# Patient Record
Sex: MALE | Race: WHITE | NOT HISPANIC OR LATINO | Employment: OTHER | URBAN - METROPOLITAN AREA
[De-identification: names, ages, dates, MRNs, and addresses within clinical notes are randomized per-mention and may not be internally consistent; named-entity substitution may affect disease eponyms.]

---

## 2017-07-03 ENCOUNTER — TRANSCRIBE ORDERS (OUTPATIENT)
Dept: ADMINISTRATIVE | Facility: HOSPITAL | Age: 67
End: 2017-07-03

## 2017-07-03 DIAGNOSIS — M54.16 LUMBAR RADICULITIS: Primary | ICD-10-CM

## 2017-07-10 ENCOUNTER — HOSPITAL ENCOUNTER (OUTPATIENT)
Dept: MRI IMAGING | Facility: HOSPITAL | Age: 67
Discharge: HOME | End: 2017-07-10
Admitting: PHYSICIAN ASSISTANT

## 2017-07-10 DIAGNOSIS — M54.16 LUMBAR RADICULITIS: ICD-10-CM

## 2017-07-10 PROCEDURE — 72148 MRI LUMBAR SPINE W/O DYE: CPT

## 2017-08-18 ENCOUNTER — OFFICE VISIT (OUTPATIENT)
Dept: NEUROSURGERY | Facility: CLINIC | Age: 67
End: 2017-08-18

## 2017-08-18 VITALS
HEIGHT: 66 IN | WEIGHT: 174.6 LBS | DIASTOLIC BLOOD PRESSURE: 90 MMHG | SYSTOLIC BLOOD PRESSURE: 140 MMHG | BODY MASS INDEX: 28.06 KG/M2 | TEMPERATURE: 97.4 F

## 2017-08-18 DIAGNOSIS — M54.31 RIGHT SIDED SCIATICA: Primary | ICD-10-CM

## 2017-08-18 PROCEDURE — 99203 OFFICE O/P NEW LOW 30 MIN: CPT | Performed by: NEUROLOGICAL SURGERY

## 2017-08-18 NOTE — PROGRESS NOTES
Subjective     Chief Complaint: Right thigh pain    Patient ID: Darvin Nieto is a 67 y.o. male seen for consultation today at the request of  Dave Morales MD    Back Pain   This is a chronic problem. The current episode started more than 1 month ago. The problem occurs intermittently. The problem has been waxing and waning since onset. Pain location: Thigh. The quality of the pain is described as aching. The pain radiates to the right thigh. The pain is at a severity of 8/10. The pain is moderate. The pain is worse during the day. Exacerbated by: Running. Stiffness is present in the morning. Associated symptoms include leg pain. Pertinent negatives include no abdominal pain, bladder incontinence, bowel incontinence, chest pain, dysuria, fever, headaches, numbness, paresis, paresthesias, pelvic pain, perianal numbness, tingling, weakness or weight loss. He has tried analgesics, NSAIDs, home exercises and heat for the symptoms. The treatment provided mild relief.       This is a 67-year-old management for this to my clinic with chief complaints of right hip and thigh pain.  He underwent physical therapy which he states temporarily alleviated his pain, however he is an avid runner and once he started jogging again, his pain returned.    The following portions of the patient's history were reviewed and updated as appropriate: allergies, current medications, past family history, past medical history, past social history, past surgical history and problem list.    Family history:   Family History   Problem Relation Age of Onset   • Cancer Father    • Alzheimer's disease Father        Social history:   Social History     Social History   • Marital status:      Spouse name: N/A   • Number of children: N/A   • Years of education: N/A     Occupational History   • Not on file.     Social History Main Topics   • Smoking status: Former Smoker     Quit date: 1978   • Smokeless tobacco: Never Used   • Alcohol use Yes    • Drug use: No   • Sexual activity: Defer     Other Topics Concern   • Not on file     Social History Narrative       Review of Systems   Constitutional: Negative for activity change, appetite change, chills, diaphoresis, fatigue, fever, unexpected weight change and weight loss.   HENT: Negative for congestion, dental problem, drooling, ear discharge, ear pain, facial swelling, hearing loss, mouth sores, nosebleeds, postnasal drip, rhinorrhea, sinus pressure, sneezing, sore throat, tinnitus, trouble swallowing and voice change.    Eyes: Negative for photophobia, pain, discharge, redness, itching and visual disturbance.   Respiratory: Negative for apnea, cough, choking, chest tightness, shortness of breath, wheezing and stridor.    Cardiovascular: Negative for chest pain, palpitations and leg swelling.   Gastrointestinal: Negative for abdominal distention, abdominal pain, anal bleeding, blood in stool, bowel incontinence, constipation, diarrhea, nausea, rectal pain and vomiting.   Endocrine: Negative for cold intolerance, heat intolerance, polydipsia, polyphagia and polyuria.   Genitourinary: Negative for bladder incontinence, decreased urine volume, difficulty urinating, dysuria, enuresis, flank pain, frequency, genital sores, hematuria, pelvic pain and urgency.   Musculoskeletal: Positive for back pain. Negative for arthralgias, gait problem, joint swelling, myalgias, neck pain and neck stiffness.   Skin: Negative for color change, pallor, rash and wound.   Allergic/Immunologic: Negative for environmental allergies, food allergies and immunocompromised state.   Neurological: Negative for dizziness, tingling, tremors, seizures, syncope, facial asymmetry, speech difficulty, weakness, light-headedness, numbness, headaches and paresthesias.   Hematological: Negative for adenopathy. Does not bruise/bleed easily.   Psychiatric/Behavioral: Negative for agitation, behavioral problems, confusion, decreased concentration,  "dysphoric mood, hallucinations, self-injury, sleep disturbance and suicidal ideas. The patient is not nervous/anxious and is not hyperactive.        Objective   Blood pressure 140/90, temperature 97.4 °F (36.3 °C), height 66\" (167.6 cm), weight 174 lb 9.6 oz (79.2 kg).  Body mass index is 28.18 kg/(m^2).    Physical Exam   Constitutional: He is oriented to person, place, and time. He appears well-developed.  Non-toxic appearance.   HENT:   Head: Normocephalic and atraumatic.   Right Ear: Hearing normal.   Left Ear: Hearing normal.   Nose: Nose normal.   Eyes: Conjunctivae, EOM and lids are normal. Pupils are equal, round, and reactive to light.   Neck: Normal range of motion. No JVD present.   Cardiovascular: Normal rate and regular rhythm.    Pulses:       Radial pulses are 2+ on the right side, and 2+ on the left side.   Pulmonary/Chest: Effort normal. No stridor. No respiratory distress. He has no wheezes.   Musculoskeletal:        Right hip: He exhibits decreased range of motion. He exhibits normal strength and no tenderness.        Left hip: He exhibits normal range of motion, normal strength and no tenderness.        Thoracic back: He exhibits no tenderness, no swelling, no pain and no spasm.        Lumbar back: He exhibits no tenderness, no bony tenderness, no swelling, no pain and no spasm.   Muscle Group    L          R  Hip Flexor          5          5  Knee Extensor  5          5  ADF                   5          5  APF                   5          5  EHL                   5          5    Decreased range of motion right hip on internal and external rotation as compared to the left hip   Neurological: He is alert and oriented to person, place, and time. He has normal strength and normal reflexes. He displays normal reflexes. No cranial nerve deficit or sensory deficit. He exhibits normal muscle tone. He displays a negative Romberg sign. Coordination and gait normal. GCS eye subscore is 4. GCS verbal " subscore is 5. GCS motor subscore is 6.   Reflex Scores:       Tricep reflexes are 2+ on the right side and 2+ on the left side.       Bicep reflexes are 2+ on the right side and 2+ on the left side.       Brachioradialis reflexes are 2+ on the right side and 2+ on the left side.       Patellar reflexes are 2+ on the right side and 2+ on the left side.       Achilles reflexes are 2+ on the right side and 2+ on the left side.  Negative straight leg raise test bilaterally   Skin: Skin is warm and dry. No rash noted. No erythema.   Psychiatric: He has a normal mood and affect. His behavior is normal. Judgment and thought content normal.   Nursing note and vitals reviewed.        Assessment/Plan     Independent Review of Radiographic Studies:      Available for my review is a MRI of the lumbar spine that was performed on 7/10/17.  This discloses mild to moderate, diffuse degenerative disc disease of the lumbar spine.  There are paracentral disc herniations present at nearly every lumbar level, however there is no evidence of lateral recess or neural foraminal stenosis.  The central canal is capacious.  There is mild loss of lumbar lordosis, however there is no evidence of spondylolisthesis or Modic endplate changes.    Medical Decision Making:      This is a 67-year-old man who presents with chief complaints of right-sided hip and leg pain.  He has tried physical therapy once which did not significantly improve his symptoms.  There is no clear MRI correlate for his symptoms.  I do not believe he has lumbosacral radiculopathy.  He is an excellent candidate for continued physical therapy.  I made a referral to a specialist in athletic physical therapy.  I reviewed the signs and symptoms of lumbosacral radiculopathy and cauda equina with the patient.  I directed him to contact my office with new, or worsening symptoms.    Otherwise, I would like to follow up with him in 3 months, or sooner if clinically  indicatedEvin Boston was seen today for sciatica and hip pain.    Diagnoses and all orders for this visit:    Right sided sciatica  -     Ambulatory Referral to Physical Therapy Evaluate and treat    Other orders  -     SCANNED - IMAGING        Return in about 3 months (around 11/18/2017).           This document signed by ALOK Pimentel MD August 18, 2017 2:05 PM

## 2017-10-10 ENCOUNTER — TRANSCRIBE ORDERS (OUTPATIENT)
Dept: ADMINISTRATIVE | Facility: HOSPITAL | Age: 67
End: 2017-10-10

## 2017-10-10 DIAGNOSIS — Z87.891 PERSONAL HISTORY OF TOBACCO USE, PRESENTING HAZARDS TO HEALTH: Primary | ICD-10-CM

## 2017-10-18 ENCOUNTER — HOSPITAL ENCOUNTER (OUTPATIENT)
Dept: ULTRASOUND IMAGING | Facility: HOSPITAL | Age: 67
Discharge: HOME | End: 2017-10-18
Attending: INTERNAL MEDICINE | Admitting: INTERNAL MEDICINE

## 2017-10-18 DIAGNOSIS — Z87.891 PERSONAL HISTORY OF TOBACCO USE, PRESENTING HAZARDS TO HEALTH: ICD-10-CM

## 2017-10-18 PROCEDURE — 76706 US ABDL AORTA SCREEN AAA: CPT

## 2020-07-21 PROCEDURE — U0003 INFECTIOUS AGENT DETECTION BY NUCLEIC ACID (DNA OR RNA); SEVERE ACUTE RESPIRATORY SYNDROME CORONAVIRUS 2 (SARS-COV-2) (CORONAVIRUS DISEASE [COVID-19]), AMPLIFIED PROBE TECHNIQUE, MAKING USE OF HIGH THROUGHPUT TECHNOLOGIES AS DESCRIBED BY CMS-2020-01-R: HCPCS | Performed by: FAMILY MEDICINE

## 2020-07-24 ENCOUNTER — TELEPHONE (OUTPATIENT)
Dept: URGENT CARE | Facility: CLINIC | Age: 70
End: 2020-07-24

## 2021-11-30 DIAGNOSIS — R00.2 PALPITATIONS: Primary | ICD-10-CM

## 2022-04-13 NOTE — PROGRESS NOTES
[facility] Cardiology    Encounter Date: 04/15/2022    Patient ID: Darvin Nieto is a 72 y.o. male retired  resident of [facility]  : 1950     PCP: Dave Morales MD       Chief Complaint: Palpitations, Dizziness, and Irregular Heart Beat      PROBLEM LIST:  1. Palpitations  a. 24-hour Holter 2021 - Dr. Lantigua: Average heart rate 81 bpm, minimum heart rate 56 bpm, max heart rate 137 bpm; rare APCs and PVCs.  No significant arrhythmia.  2. Hypertension  3. Hyperlipidemia  4. Prediabetes  5. Carotid artery disease  6. Obstructive sleep apnea, CPAP compliance  7. History of tobacco abuse, cessation  --pack-year history     History of Present Illness: Darvin Nieto is a 72 y.o. male who presents to the cardiology office today to establish care. He complains of palpitations. He has been having palpitations x20 years but notes that they got substantially worse last fall. His PCP obtained a holter and the results are as listed above. He was started on Cardizem 180 and notes that this improved his episodes of palpitations. Prior to Cardizem, he was having these episodes multiple times daily and they were lasting longer periods of time. Since Cardizem, he is only having episodes of skipping only 1-2x/week, mostly after exercise, and they last only seconds to a minute.  Additionally, he notes that it feels like it takes a long time for his heart rate to return to baseline.  These episodes do not wake him up from sleep.  He does have a regular exercise routine to include power walks, weightlifting, and biking.  He is a previous smoker, but stopped smoking almost 50 years ago.  He does drink 2 caffeinated beverages per day and consumes at least 1 alcoholic beverage per day.  He denies chest pain, shortness of breath, orthopnea, or edema.  He denies any strokelike symptoms.    Past Medical History:   Past Medical History:   Diagnosis Date   •  Hyperlipidemia    • Hypertension    • Low back pain        Past Surgical History:   Past Surgical History:   Procedure Laterality Date   • ABDOMINAL SURGERY     • APPENDECTOMY     • BLEPHAROPLASTY     • HERNIA REPAIR         Family History:   Family History   Problem Relation Age of Onset   • Stroke Mother    • Cancer Father    • Alzheimer's disease Father        Social History:   Social History     Socioeconomic History   • Marital status:    Tobacco Use   • Smoking status: Former Smoker     Quit date:      Years since quittin.3   • Smokeless tobacco: Never Used   Substance and Sexual Activity   • Alcohol use: Yes   • Drug use: No   • Sexual activity: Defer       Tobacco History:   Social History     Tobacco Use   Smoking Status Former Smoker   • Quit date:    • Years since quittin.3   Smokeless Tobacco Never Used       Medications:     Current Outpatient Medications:   •  aspirin 81 MG chewable tablet, Chew 81 mg daily., Disp: , Rfl:   •  Azilsartan Medoxomil (Edarbi) 40 MG tablet, Edarbi 40 mg tablet  Take 0.5 tablets every day by oral route., Disp: , Rfl:   •  dilTIAZem CD (CARDIZEM CD) 180 MG 24 hr capsule, Take 180 mg by mouth Daily., Disp: , Rfl:   •  ezetimibe (ZETIA) 10 MG tablet, Take 10 mg by mouth Daily., Disp: , Rfl:   •  meloxicam (MOBIC) 15 MG tablet, Take 15 mg by mouth Daily., Disp: , Rfl:   •  pravastatin (PRAVACHOL) 40 MG tablet, Take 40 mg by mouth daily., Disp: , Rfl:     Allergies:   No Known Allergies  The following portions of the patient's history were reviewed and updated as appropriate: allergies, current medications, past family history, past medical history, past social history, past surgical history and problem list.    Review of Systems   Constitution: Negative for chills, fever, fatigue, generalized weakness.   Cardiovascular: Positive for palpitations negative for chest pain, dyspnea on exertion, leg swelling, orthopnea, and syncope.   Respiratory: Negative  "for cough, shortness of breath, and wheezing.  HENT: Negative for ear pain, nosebleeds, and tinnitus.  Gastrointestinal: Negative for abdominal pain, constipation, diarrhea, nausea and vomiting.   Genitourinary: No urinary symptoms.  Musculoskeletal: Negative for muscle cramps.  Neurological: Positive for dizziness, negative for headaches, loss of balance, numbness, and symptoms of stroke.  Psychiatric: Normal mental status.     All other systems reviewed and are negative.        Objective:     /74 (BP Location: Left arm, Patient Position: Sitting)   Pulse 56   Ht 165.1 cm (65\")   Wt 75.3 kg (166 lb)   SpO2 99%   BMI 27.62 kg/m²    BP Recheck 146/82    Physical Exam  Constitutional: Patient appears well-developed and well-nourished.   HENT: HEENT exam unremarkable.   Neck: Neck supple. No JVD present. No carotid bruits.   Cardiovascular: Normal rate, regular rhythm and normal heart sounds. No murmur heard.   2+ symmetric pulses.   Pulmonary/Chest: Breath sounds normal. Does not exhibit tenderness.   Abdominal: Abdomen benign.   Musculoskeletal: Does not exhibit edema.   Neurological: Neurological exam unremarkable.   Vitals reviewed.    Data Review:   Lab review 10/31/2022  Lipid panel: , HDL 66, , LDL 68  CMP: GLU 94, BUN 30, creatinine 1.42, GFR 49, , K4.7, , CO2 21, CA 10.1, AST 23, ALT 18      ECG 12 Lead    Date/Time: 4/15/2022 9:49 AM  Performed by: Kim Gray PA-C  Authorized by: Kim Gray PA-C   Comparison: not compared with previous ECG   Previous ECG: no previous ECG available  Rhythm: sinus bradycardia  Rate: bradycardic  BPM: 56    Clinical impression: abnormal EKG  Comments: Left axis deviation               Assessment:      Diagnosis Plan   1. Dizziness  patient with palpitations which have improved since the addition of Cardizem however he has ongoing occasional episodes with associated dizziness.  Will obtain 14-day ZIO, GXT, and echocardiogram for " further evaluation.     2. Palpitations   patient with palpitations and associated dizziness.  Will obtain 14-day ZIO, GXT and echocardiogram for further evaluation.   3. Primary hypertension   patient reports home BP 130s over 70s.  Continue antihypertensive regimen   4. Mixed hyperlipidemia   well-controlled.  Continue statin therapy.     Plan:   Patient with ongoing palpitations with associated dizziness although the episodes have remarkably improved since the addition of Cardizem.  With his history of hypertension and frequent alcohol consumption we are concerned about paroxysmal atrial fibrillation.  He also reports relationship of symptoms to exercise.  At this time we will obtain a 14-day ZIO monitor, echocardiogram, and GXT for further cardiac evaluation.    Patient advised to cut back on caffeine and alcohol consumption.   Continue current medications.   FU in 6 weeks, sooner as needed.  Thank you for allowing us to participate in the care of your patient.     Scribed for Rhonda Lantigua MD by Kim Gray PA-C. 4/15/2022  09:49 EDT     I, Rhonda Lantigua MD, personally performed the services described in this documentation as scribed by the above named individual in my presence, and it is both accurate and complete.  4/15/2022  10:04 EDT        Part of this note may be an electronic transcription/translation of spoken language to printed text using the Dragon Dictation System.

## 2022-04-15 ENCOUNTER — OFFICE VISIT (OUTPATIENT)
Dept: CARDIOLOGY | Facility: CLINIC | Age: 72
End: 2022-04-15

## 2022-04-15 VITALS
BODY MASS INDEX: 27.66 KG/M2 | OXYGEN SATURATION: 99 % | HEART RATE: 56 BPM | SYSTOLIC BLOOD PRESSURE: 148 MMHG | DIASTOLIC BLOOD PRESSURE: 74 MMHG | HEIGHT: 65 IN | WEIGHT: 166 LBS

## 2022-04-15 DIAGNOSIS — R42 DIZZINESS: Primary | ICD-10-CM

## 2022-04-15 DIAGNOSIS — I10 PRIMARY HYPERTENSION: ICD-10-CM

## 2022-04-15 DIAGNOSIS — I20.8 ANGINAL EQUIVALENT: ICD-10-CM

## 2022-04-15 DIAGNOSIS — R00.2 PALPITATIONS: ICD-10-CM

## 2022-04-15 DIAGNOSIS — E78.2 MIXED HYPERLIPIDEMIA: ICD-10-CM

## 2022-04-15 PROCEDURE — 93000 ELECTROCARDIOGRAM COMPLETE: CPT | Performed by: INTERNAL MEDICINE

## 2022-04-15 PROCEDURE — 99204 OFFICE O/P NEW MOD 45 MIN: CPT | Performed by: INTERNAL MEDICINE

## 2022-04-15 RX ORDER — AZILSARTAN KAMEDOXOMIL 40 MG/1
TABLET ORAL
COMMUNITY

## 2022-04-15 RX ORDER — EZETIMIBE 10 MG/1
10 TABLET ORAL DAILY
COMMUNITY

## 2022-04-15 RX ORDER — DILTIAZEM HYDROCHLORIDE 180 MG/1
180 CAPSULE, COATED, EXTENDED RELEASE ORAL DAILY
COMMUNITY
Start: 2022-04-06 | End: 2022-07-29 | Stop reason: SDUPTHER

## 2022-04-15 RX ORDER — MELOXICAM 15 MG/1
15 TABLET ORAL DAILY
COMMUNITY

## 2022-05-04 ENCOUNTER — HOSPITAL ENCOUNTER (OUTPATIENT)
Dept: CARDIOLOGY | Facility: HOSPITAL | Age: 72
Discharge: HOME | End: 2022-05-04
Admitting: INTERNAL MEDICINE

## 2022-05-04 VITALS
DIASTOLIC BLOOD PRESSURE: 86 MMHG | HEIGHT: 65 IN | WEIGHT: 166 LBS | SYSTOLIC BLOOD PRESSURE: 130 MMHG | OXYGEN SATURATION: 99 % | BODY MASS INDEX: 27.66 KG/M2 | HEART RATE: 69 BPM

## 2022-05-04 VITALS
HEIGHT: 65 IN | SYSTOLIC BLOOD PRESSURE: 130 MMHG | DIASTOLIC BLOOD PRESSURE: 86 MMHG | BODY MASS INDEX: 27.66 KG/M2 | WEIGHT: 166 LBS

## 2022-05-04 DIAGNOSIS — R00.2 PALPITATIONS: ICD-10-CM

## 2022-05-04 DIAGNOSIS — E78.2 MIXED HYPERLIPIDEMIA: ICD-10-CM

## 2022-05-04 DIAGNOSIS — I20.8 ANGINAL EQUIVALENT: ICD-10-CM

## 2022-05-04 DIAGNOSIS — I10 PRIMARY HYPERTENSION: ICD-10-CM

## 2022-05-04 DIAGNOSIS — R42 DIZZINESS: ICD-10-CM

## 2022-05-04 LAB
BH CV ECHO MEAS - AO MAX PG: 4.1 MMHG
BH CV ECHO MEAS - AO MEAN PG: 2.22 MMHG
BH CV ECHO MEAS - AO ROOT DIAM: 3.9 CM
BH CV ECHO MEAS - AO V2 MAX: 101.7 CM/SEC
BH CV ECHO MEAS - AO V2 VTI: 21.6 CM
BH CV ECHO MEAS - AVA(I,D): 2.6 CM2
BH CV ECHO MEAS - EDV(CUBED): 94.6 ML
BH CV ECHO MEAS - EDV(MOD-SP2): 126 ML
BH CV ECHO MEAS - EDV(MOD-SP4): 160 ML
BH CV ECHO MEAS - EF(MOD-BP): 54 %
BH CV ECHO MEAS - EF(MOD-SP2): 53.2 %
BH CV ECHO MEAS - EF(MOD-SP4): 59.4 %
BH CV ECHO MEAS - ESV(CUBED): 37.2 ML
BH CV ECHO MEAS - ESV(MOD-SP2): 59 ML
BH CV ECHO MEAS - ESV(MOD-SP4): 65 ML
BH CV ECHO MEAS - FS: 26.7 %
BH CV ECHO MEAS - IVS/LVPW: 0.92 CM
BH CV ECHO MEAS - IVSD: 1.01 CM
BH CV ECHO MEAS - LA DIMENSION: 3.6 CM
BH CV ECHO MEAS - LAT PEAK E' VEL: 14.9 CM/SEC
BH CV ECHO MEAS - LV DIASTOLIC VOL/BSA (35-75): 87.5 CM2
BH CV ECHO MEAS - LV MASS(C)D: 168.9 GRAMS
BH CV ECHO MEAS - LV MAX PG: 3.4 MMHG
BH CV ECHO MEAS - LV MEAN PG: 1.69 MMHG
BH CV ECHO MEAS - LV SYSTOLIC VOL/BSA (12-30): 35.6 CM2
BH CV ECHO MEAS - LV V1 MAX: 91.9 CM/SEC
BH CV ECHO MEAS - LV V1 VTI: 18.4 CM
BH CV ECHO MEAS - LVIDD: 4.6 CM
BH CV ECHO MEAS - LVIDS: 3.3 CM
BH CV ECHO MEAS - LVOT AREA: 3.1 CM2
BH CV ECHO MEAS - LVOT DIAM: 1.99 CM
BH CV ECHO MEAS - LVPWD: 1.1 CM
BH CV ECHO MEAS - PA ACC SLOPE: 848.8 CM/SEC2
BH CV ECHO MEAS - PA ACC TIME: 0.06 SEC
BH CV ECHO MEAS - PA PR(ACCEL): 53.6 MMHG
BH CV ECHO MEAS - PA V2 MAX: 70 CM/SEC
BH CV ECHO MEAS - RAP SYSTOLE: 3 MMHG
BH CV ECHO MEAS - RVSP: 19 MMHG
BH CV ECHO MEAS - SI(MOD-SP2): 36.7 ML/M2
BH CV ECHO MEAS - SI(MOD-SP4): 52 ML/M2
BH CV ECHO MEAS - SV(LVOT): 57 ML
BH CV ECHO MEAS - SV(MOD-SP2): 67 ML
BH CV ECHO MEAS - SV(MOD-SP4): 95 ML
BH CV ECHO MEAS - TAPSE (>1.6): 2.3 CM
BH CV ECHO MEAS - TR MAX PG: 16.8 MMHG
BH CV ECHO MEAS - TR MAX VEL: 205 CM/SEC
BH CV STRESS BP STAGE 1: NORMAL
BH CV STRESS BP STAGE 2: NORMAL
BH CV STRESS DURATION MIN STAGE 1: 3
BH CV STRESS DURATION MIN STAGE 2: 3
BH CV STRESS DURATION MIN STAGE 3: 1
BH CV STRESS DURATION SEC STAGE 1: 0
BH CV STRESS DURATION SEC STAGE 2: 0
BH CV STRESS DURATION SEC STAGE 3: 17
BH CV STRESS GRADE STAGE 1: 10
BH CV STRESS GRADE STAGE 2: 12
BH CV STRESS GRADE STAGE 3: 14
BH CV STRESS HR STAGE 1: 105
BH CV STRESS HR STAGE 2: 118
BH CV STRESS HR STAGE 3: 136
BH CV STRESS METS STAGE 1: 5
BH CV STRESS METS STAGE 2: 7.5
BH CV STRESS METS STAGE 3: 10
BH CV STRESS O2 STAGE 1: 100
BH CV STRESS O2 STAGE 2: 100
BH CV STRESS O2 STAGE 3: 100
BH CV STRESS PROTOCOL 1: NORMAL
BH CV STRESS RECOVERY BP: NORMAL MMHG
BH CV STRESS RECOVERY HR: 85 BPM
BH CV STRESS RECOVERY O2: 100 %
BH CV STRESS SPEED STAGE 1: 1.7
BH CV STRESS SPEED STAGE 2: 2.5
BH CV STRESS SPEED STAGE 3: 3.4
BH CV STRESS STAGE 1: 1
BH CV STRESS STAGE 2: 2
BH CV STRESS STAGE 3: 3
BH CV XLRA - RV BASE: 3.5 CM
BH CV XLRA - RV LENGTH: 6.8 CM
BH CV XLRA - RV MID: 2.7 CM
BH CV XLRA - TDI S': 9.83 CM/SEC
LEFT ATRIUM VOLUME INDEX: 23 ML/M2
LV EF 2D ECHO EST: 60 %
MAXIMAL PREDICTED HEART RATE: 148 BPM
MAXIMAL PREDICTED HEART RATE: 148 BPM
PERCENT MAX PREDICTED HR: 91.89 %
STRESS BASELINE BP: NORMAL MMHG
STRESS BASELINE HR: 68 BPM
STRESS O2 SAT REST: 99 %
STRESS PERCENT HR: 108 %
STRESS POST ESTIMATED WORKLOAD: 10.1 METS
STRESS POST EXERCISE DUR MIN: 7 MIN
STRESS POST EXERCISE DUR SEC: 17 SEC
STRESS POST O2 SAT PEAK: 100 %
STRESS POST PEAK BP: NORMAL MMHG
STRESS POST PEAK HR: 136 BPM
STRESS TARGET HR: 126 BPM
STRESS TARGET HR: 126 BPM

## 2022-05-04 PROCEDURE — 93306 TTE W/DOPPLER COMPLETE: CPT | Performed by: INTERNAL MEDICINE

## 2022-05-04 PROCEDURE — 93306 TTE W/DOPPLER COMPLETE: CPT

## 2022-05-04 PROCEDURE — 93017 CV STRESS TEST TRACING ONLY: CPT

## 2022-05-04 PROCEDURE — 93018 CV STRESS TEST I&R ONLY: CPT | Performed by: INTERNAL MEDICINE

## 2022-07-29 ENCOUNTER — OFFICE VISIT (OUTPATIENT)
Dept: CARDIOLOGY | Facility: CLINIC | Age: 72
End: 2022-07-29

## 2022-07-29 VITALS
DIASTOLIC BLOOD PRESSURE: 70 MMHG | HEART RATE: 68 BPM | HEIGHT: 65 IN | BODY MASS INDEX: 26.99 KG/M2 | SYSTOLIC BLOOD PRESSURE: 130 MMHG | WEIGHT: 162 LBS | OXYGEN SATURATION: 97 %

## 2022-07-29 DIAGNOSIS — I10 PRIMARY HYPERTENSION: ICD-10-CM

## 2022-07-29 DIAGNOSIS — R00.2 PALPITATIONS: ICD-10-CM

## 2022-07-29 DIAGNOSIS — E78.2 MIXED HYPERLIPIDEMIA: ICD-10-CM

## 2022-07-29 DIAGNOSIS — R42 DIZZINESS: Primary | ICD-10-CM

## 2022-07-29 PROCEDURE — 99214 OFFICE O/P EST MOD 30 MIN: CPT | Performed by: INTERNAL MEDICINE

## 2022-07-29 RX ORDER — DILTIAZEM HYDROCHLORIDE 240 MG/1
240 CAPSULE, COATED, EXTENDED RELEASE ORAL DAILY
Qty: 90 CAPSULE | Refills: 3 | Status: SHIPPED | OUTPATIENT
Start: 2022-07-29

## 2022-07-29 NOTE — PROGRESS NOTES
[facility] Cardiology    Encounter Date: 2022    Patient ID: Darvin Nieto is a 72 y.o. male.  : 1950     PCP: Dave Morales MD       Chief Complaint: Primary hypertension      PROBLEM LIST:  1. Palpitations  a. 24-hour Holter 2021 - Dr. Lantigua: Average heart rate 81 bpm, minimum heart rate 56 bpm, max heart rate 137 bpm; rare APCs and PVCs.  No significant arrhythmia.  b. Treadmill stress test, 2022; The test is negative for anginal chest pain or diagnostic ST segment changes at high workload and target heart rate. Occasional PACs and PVCs were noted throughout the stress test with no relationship to exercise. No exercise-induced arrhythmias were noted.  c. Echocardiogram, 2022; EF 60%. Trace to mild MR. Trace TR with normal RVSP.   d. Holter monitor, 2022; SR. Rare APCs and PVCs. One short NSVT (4 beats). Rare short SVT up to 10 beats. No symptoms.   2. Hypertension  3. Hyperlipidemia  4. Prediabetes  5. Carotid artery disease  6. Obstructive sleep apnea, CPAP compliance  7. History of tobacco abuse, cessation  -12-pack-year history        History of Present Illness  Patient presents today for a follow-up with a history of palpiations and cardiac risk factors. Since last visit, patient has continued to have palpitations approximately twice per week. He denies any chest pain, shortness of breath, palpitations, orthopnea, and edema.    No Known Allergies      Current Outpatient Medications:   •  aspirin 81 MG chewable tablet, Chew 81 mg daily., Disp: , Rfl:   •  Azilsartan Medoxomil (Edarbi) 40 MG tablet, Edarbi 40 mg tablet  Take 0.5 tablets every day by oral route., Disp: , Rfl:   •  dilTIAZem CD (CARDIZEM CD) 180 MG 24 hr capsule, Take 180 mg by mouth Daily., Disp: , Rfl:   •  ezetimibe (ZETIA) 10 MG tablet, Take 10 mg by mouth Daily., Disp: , Rfl:   •  meloxicam (MOBIC) 15 MG tablet, Take 15 mg by mouth Daily., Disp: , Rfl:   •   "pravastatin (PRAVACHOL) 40 MG tablet, Take 40 mg by mouth daily., Disp: , Rfl:     The following portions of the patient's history were reviewed and updated as appropriate: allergies, current medications, past family history, past medical history, past social history, past surgical history and problem list.    ROS  Review of Systems   Constitution: Negative for chills, fever, fatigue, generalized weakness.   Cardiovascular: Negative for chest pain, dyspnea on exertion, leg swelling, palpitations, orthopnea, and syncope.   Respiratory: Negative for cough, shortness of breath, and wheezing.  HENT: Negative for ear pain, nosebleeds, and tinnitus.  Gastrointestinal: Negative for abdominal pain, constipation, diarrhea, nausea and vomiting.   Genitourinary: No urinary symptoms.  Musculoskeletal: Negative for muscle cramps.  Neurological: Negative for dizziness, headaches, loss of balance, numbness, and symptoms of stroke.  Psychiatric: Normal mental status.     All other systems reviewed and are negative.        Objective:     /70 (BP Location: Left arm, Patient Position: Sitting)   Pulse 68   Ht 165.1 cm (65\")   Wt 73.5 kg (162 lb)   SpO2 97%   BMI 26.96 kg/m²      Physical Exam  Constitutional: Patient appears well-developed and well-nourished.   HENT: HEENT exam unremarkable.   Neck: Neck supple. No JVD present. No carotid bruits.   Cardiovascular: Normal rate, regular rhythm and normal heart sounds. No murmur heard.   2+ symmetric pulses.   Pulmonary/Chest: Breath sounds normal. Does not exhibit tenderness.   Abdominal: Abdomen benign.   Musculoskeletal: Does not exhibit edema.   Neurological: Neurological exam unremarkable.   Vitals reviewed.    Data Review:     Lab review 10/31/2021  Lipid panel: , HDL 66, , LDL 68  CMP: GLU 94, BUN 30, creatinine 1.42, GFR 49, , K4.7, , CO2 21, CA 10.1, AST 23, ALT 18     Procedures             Assessment:      Diagnosis Plan   1. Dizziness  " Occurs with changes in position.  Discussed the importance of slow transitions.  If symptoms persist we will discontinue Edarbi   2. Palpitations   Holter monitor with few episodes of SVT and 1 NSVT episode.  Stress test and echocardiogram negative for ischemic findings.  We will increase Cardizem to 240 mg daily   3. Primary hypertension   well-controlled.  Continue current hypertensive regimen.   4. Mixed hyperlipidemia   well-controlled.  Continue statin therapy and Zetia.     Plan:   Holter monitor with 10 beat SVT and 4 beat NSVT.  We will increase Cardizem to 240 mg daily.  Echocardiogram and stress test with no acute findings.  Continue all other current medications.   FU in 6 MO, sooner as needed.  Thank you for allowing us to participate in the care of your patient.     Kim Gray PA-C    Please note that portions of this note may have been completed with a voice recognition program. Efforts were made to edit the dictations, but occasionally words are mistranscribed.

## 2023-09-26 ENCOUNTER — TRANSCRIBE ORDERS (OUTPATIENT)
Dept: GENERAL RADIOLOGY | Facility: HOSPITAL | Age: 73
End: 2023-09-26
Payer: MEDICARE

## 2023-09-26 ENCOUNTER — HOSPITAL ENCOUNTER (OUTPATIENT)
Dept: GENERAL RADIOLOGY | Facility: HOSPITAL | Age: 73
Discharge: HOME | End: 2023-09-26
Admitting: INTERNAL MEDICINE
Payer: MEDICARE

## 2023-09-26 DIAGNOSIS — M25.571 RIGHT ANKLE PAIN, UNSPECIFIED CHRONICITY: Primary | ICD-10-CM

## 2023-09-26 DIAGNOSIS — M79.671 RIGHT FOOT PAIN: ICD-10-CM

## 2023-09-26 PROCEDURE — 73610 X-RAY EXAM OF ANKLE: CPT

## 2023-09-26 PROCEDURE — 73630 X-RAY EXAM OF FOOT: CPT

## 2024-11-29 ENCOUNTER — TRANSCRIBE ORDERS (OUTPATIENT)
Dept: ADMINISTRATIVE | Facility: HOSPITAL | Age: 74
End: 2024-11-29
Payer: MEDICARE

## 2024-11-29 DIAGNOSIS — R41.9 UNSPECIFIED SYMPTOMS AND SIGNS INVOLVING COGNITIVE FUNCTIONS AND AWARENESS: Primary | ICD-10-CM

## 2024-12-23 ENCOUNTER — HOSPITAL ENCOUNTER (OUTPATIENT)
Dept: GENERAL RADIOLOGY | Facility: HOSPITAL | Age: 74
Discharge: HOME | End: 2024-12-23
Payer: MEDICARE

## 2024-12-23 ENCOUNTER — HOSPITAL ENCOUNTER (OUTPATIENT)
Dept: MRI IMAGING | Facility: HOSPITAL | Age: 74
Discharge: HOME | End: 2024-12-23
Payer: MEDICARE

## 2024-12-23 DIAGNOSIS — Z01.89 ENCOUNTER FOR IMAGING TO SCREEN FOR METAL PRIOR TO MAGNETIC RESONANCE IMAGING (MRI): ICD-10-CM

## 2024-12-23 DIAGNOSIS — R41.9 UNSPECIFIED SYMPTOMS AND SIGNS INVOLVING COGNITIVE FUNCTIONS AND AWARENESS: ICD-10-CM

## 2024-12-23 PROCEDURE — 71045 X-RAY EXAM CHEST 1 VIEW: CPT

## 2024-12-23 PROCEDURE — 25510000002 GADOBENATE DIMEGLUMINE 529 MG/ML SOLUTION: Performed by: INTERNAL MEDICINE

## 2024-12-23 PROCEDURE — 74018 RADEX ABDOMEN 1 VIEW: CPT

## 2024-12-23 PROCEDURE — 70250 X-RAY EXAM OF SKULL: CPT

## 2024-12-23 PROCEDURE — A9577 INJ MULTIHANCE: HCPCS | Performed by: INTERNAL MEDICINE

## 2024-12-23 PROCEDURE — 70553 MRI BRAIN STEM W/O & W/DYE: CPT

## 2024-12-23 RX ADMIN — GADOBENATE DIMEGLUMINE 15 ML: 529 INJECTION, SOLUTION INTRAVENOUS at 16:03

## 2025-07-09 ENCOUNTER — HOSPITAL ENCOUNTER (OUTPATIENT)
Dept: GENERAL RADIOLOGY | Facility: HOSPITAL | Age: 75
Discharge: HOME | End: 2025-07-09
Admitting: INTERNAL MEDICINE
Payer: MEDICARE

## 2025-07-09 ENCOUNTER — TRANSCRIBE ORDERS (OUTPATIENT)
Dept: GENERAL RADIOLOGY | Facility: HOSPITAL | Age: 75
End: 2025-07-09
Payer: MEDICARE

## 2025-07-09 DIAGNOSIS — M25.652 DECREASED RANGE OF MOTION OF BOTH HIPS: Primary | ICD-10-CM

## 2025-07-09 DIAGNOSIS — M53.3 PAIN OF RIGHT SACROILIAC JOINT: ICD-10-CM

## 2025-07-09 DIAGNOSIS — M25.652 DECREASED RANGE OF MOTION OF BOTH HIPS: ICD-10-CM

## 2025-07-09 DIAGNOSIS — M25.651 DECREASED RANGE OF MOTION OF BOTH HIPS: Primary | ICD-10-CM

## 2025-07-09 DIAGNOSIS — G57.01 PIRIFORMIS SYNDROME OF RIGHT SIDE: ICD-10-CM

## 2025-07-09 DIAGNOSIS — M25.651 DECREASED RANGE OF MOTION OF BOTH HIPS: ICD-10-CM

## 2025-07-09 PROCEDURE — 73502 X-RAY EXAM HIP UNI 2-3 VIEWS: CPT
